# Patient Record
Sex: MALE | Race: WHITE
[De-identification: names, ages, dates, MRNs, and addresses within clinical notes are randomized per-mention and may not be internally consistent; named-entity substitution may affect disease eponyms.]

---

## 2019-12-11 ENCOUNTER — HOSPITAL ENCOUNTER (OUTPATIENT)
Dept: HOSPITAL 96 - M.CRD | Age: 84
End: 2019-12-11
Attending: NURSE PRACTITIONER
Payer: MEDICARE

## 2019-12-11 DIAGNOSIS — I08.3: Primary | ICD-10-CM

## 2019-12-11 DIAGNOSIS — I48.0: ICD-10-CM

## 2019-12-11 NOTE — 2DMMODE
[place] [address]
Phone:  [phone] 2 D/M-MODE ECHOCARDIOGRAM     
_______________________________________________________________________________
 
Name:         TANIA DEGROOT          Room:                     REG CLI
M.R.#:    [id number]     Account #:     [id number]  
Admission:    19    Attend Phys:   Vilma Puckett RN
Discharge:                Date of Birth: [birth year]  
Date of Service: 19 1305  Report #:      [id number]
        [id number]
_______________________________________________________________________________
THIS REPORT FOR:  //name//                      
 
 
--------------- APPROVED REPORT --------------
 
 
Study performed:  2019 10:45:32
 
EXAM: Comprehensive 2D, Doppler, and color-flow 
Echocardiogram 
Patient Location: Out-Patient   
 
      BSA:         2.11
HR: 70 bpm BP:          120/70 mmHg 
 
Other Information 
Study Quality: Good
 
Indications
Atrial Fibrillation
 
2D Dimensions
IVSd:  12.62 (7-11mm) LVOT Diam:  20.56 (18-24mm) 
LVDd:  47.38 mm  
PWd:  11.28 (7-11mm) Ascending Ao:  33.55 (22-36mm)
LVDs:  21.88 (25-40mm) 
Aortic Root:  30.47 mm 
 
Volumes
Left Atrial Volume (Systole) 
    LA ESV Index:  19.00 mL/m2
 
Aortic Valve
AoV Peak Ziggy.:  2.13 m/s 
AO Peak Gr.:  18.19 mmHg LVOT Max P.15 mmHg
AO Mean Gr.:  10.34 mmHg LVOT Mean P.77 mmHg
    LVOT Max V:  1.02 m/s
AO V2 VTI:  42.22 cm  LVOT Mean V:  0.59 m/s
MARJORIE (VTI):  1.68 cm2  LVOT V1 VTI:  21.40 cm
 
Mitral Valve
MV Peak Gr.:  8.32 mmHg  
MV Mean Gr.:  4.03 mmHg  E/A Ratio:  0.73
    MV Decel. Time:  292.14 ms
MV E Max Ziggy.:  0.93 m/s 
MV PHT:  84.72 ms  
 
 
[place] [address]
Phone:  [phone]                     2 D/M-MODE ECHOCARDIOGRAM     
_______________________________________________________________________________
 
Name:         TANIA DEGROOT          Room:                     REG CLI
M.R.#:    [id number]     Account #:     [id number]  
Admission:    19    Attend Phys:   Vilma Puckett RN
Discharge:                Date of Birth: [birth year]  
Date of Service: 19 1305  Report #:      [id number]
        [id number]
_______________________________________________________________________________
MVA (PHT):  2.60 cm2  
 
TDI
E/Lateral E':  13.29 E/Medial E':  15.50
   Medial E' Ziggy.:  0.06 m/s
   Lateral E' Ziggy.:  0.07 m/s
 
Pulmonary Valve
PV Peak Ziggy.:  1.07 m/s PV Peak Gr.:  4.57 mmHg
 
Tricuspid Valve
    RAP Estimate:  5.00 mmHg
TR Peak Gr.:  18.02 mmHg RVSP:  23.02 mmHg
    PA Pressure:  23.02 mmHg
 
Left Ventricle
The left ventricle is normal size. There is normal LV segmental wall 
motion. There is normal left ventricular wall thickness. Left 
ventricular systolic function is normal. LVEF is 55-60%. Grade I - 
abnormal relaxation pattern.
 
Right Ventricle
The right ventricle is normal size. The right ventricular systolic 
function is normal.
 
Atria
Left atrium is mildly dilated. The right atrium size is 
normal.
 
Aortic Valve
Mild aortic valve sclerosis. No aortic regurgitation is present. Mild 
aortic stenosis.
 
Mitral Valve
Mild mitral annular calcification. Mitral valve leaflets are mildly 
thickened. Mild mitral regurgitation. Mild mitral 
stenosis.
 
Tricuspid Valve
The tricuspid valve is normal in structure. Mild tricuspid 
regurgitation. No pulmonary hypertension.
 
Pulmonic Valve
The pulmonary valve is normal in structure. There is no pulmonic 
valvular regurgitation.
 
 
 
[place] [address]
Phone:  [phone]                     2 D/M-MODE ECHOCARDIOGRAM     
_______________________________________________________________________________
 
Name:         TANIA DEGROOT          Room:                     REG CLI
M.R.#:    [id number]     Account #:     [id number]  
Admission:    19    Attend Phys:   Vilma Puckett RN
Discharge:                Date of Birth: [birth year]  
Date of Service: 19 1305  Report #:      [id number]
        [id number]
_______________________________________________________________________________
Great Vessels
The aortic root is normal in size. IVC is normal in size and 
collapses >50% with inspiration.
 
Pericardium
There is no pericardial effusion.
 
<Conclusion>
The left ventricle is normal size.
There is normal left ventricular wall thickness.
Left ventricular systolic function is normal.
LVEF is 55-60%.
Grade I - abnormal relaxation pattern.
Left atrium is mildly dilated.
Mild aortic valve sclerosis.
Mild aortic stenosis.
Mild mitral annular calcification.
Mitral valve leaflets are mildly thickened.
Mild mitral regurgitation.
Mild mitral stenosis.
Mild tricuspid regurgitation.
No pulmonary hypertension.
IVC is normal in size and collapses >50% with inspiration.
 
 
 
 
 
 
 
 
 
 
 
 
 
 
 
 
 
 
 
 
 
  <ELECTRONICALLY SIGNED>
                                           By: Ariel Toney MD, FACC   
  19     1305
D: 19 1305   _____________________________________
T: 19 1305   Ariel Toney MD, FACC     /INF

## 2021-06-15 ENCOUNTER — HOSPITAL ENCOUNTER (OUTPATIENT)
Dept: HOSPITAL 96 - M.LAB | Age: 86
End: 2021-06-15
Attending: NURSE PRACTITIONER
Payer: MEDICARE

## 2021-06-15 DIAGNOSIS — I48.0: Primary | ICD-10-CM
